# Patient Record
Sex: MALE | URBAN - NONMETROPOLITAN AREA
[De-identification: names, ages, dates, MRNs, and addresses within clinical notes are randomized per-mention and may not be internally consistent; named-entity substitution may affect disease eponyms.]

---

## 2024-03-28 ENCOUNTER — LAB (OUTPATIENT)
Dept: URBAN - NONMETROPOLITAN AREA CLINIC 2 | Facility: CLINIC | Age: 19
End: 2024-03-28

## 2024-03-28 ENCOUNTER — OV NP (OUTPATIENT)
Dept: URBAN - NONMETROPOLITAN AREA CLINIC 2 | Facility: CLINIC | Age: 19
End: 2024-03-28
Payer: COMMERCIAL

## 2024-03-28 VITALS
BODY MASS INDEX: 27.09 KG/M2 | HEART RATE: 69 BPM | HEIGHT: 72 IN | WEIGHT: 200 LBS | DIASTOLIC BLOOD PRESSURE: 81 MMHG | SYSTOLIC BLOOD PRESSURE: 151 MMHG | TEMPERATURE: 98 F

## 2024-03-28 DIAGNOSIS — B27.90 INFECTIOUS MONONUCLEOSIS WITHOUT COMPLICATION, INFECTIOUS MONONUCLEOSIS DUE TO UNSPECIFIED ORGANISM: ICD-10-CM

## 2024-03-28 DIAGNOSIS — R74.8 ELEVATED LIVER ENZYMES: ICD-10-CM

## 2024-03-28 DIAGNOSIS — Z83.79 FAMILY HISTORY OF LIVER DISEASE: ICD-10-CM

## 2024-03-28 PROBLEM — 266902008: Status: ACTIVE | Noted: 2024-03-28

## 2024-03-28 PROBLEM — 271558008: Status: ACTIVE | Noted: 2024-03-28

## 2024-03-28 PROBLEM — 707724006: Status: ACTIVE | Noted: 2024-03-28

## 2024-03-28 PROCEDURE — 99244 OFF/OP CNSLTJ NEW/EST MOD 40: CPT | Performed by: NURSE PRACTITIONER

## 2024-03-28 NOTE — HPI-TODAY'S VISIT:
3/28/2024 Keenan is a 18-year-old male referred to me by Dr. Archie Kelley for consultation of elevated liver enzymes.  A copy this note be sent to the referring physician. He contracted mono in December 2023 a week before Adrianna.  He had fever chills and jaundice.  His symptoms lasted for approximately 3 weeks, and then abated.  His liver enzymes unfortunately have not decreased significantly.  He has had contrasted imaging with hepatosplenomegaly.  He does take NSAIDs, his last dose was about 3 weeks ago.  He does drink typically 3 days a week usually 6-8 beers, usually [place] light.  He has a family history of liver disease in his paternal grandmother who contracted hepatitis C after a blood transfusion and is status post liver transplant x 2.  He has no other congenital or familial liver diseases that he is aware of.  Today he agrees to pursue labs, we will also check ultrasound Doppler.  Consider liver biopsy if his liver enzymes increased.  MB

## 2024-03-30 LAB
A/G RATIO: 1.6
ALBUMIN: 4.8
ALKALINE PHOSPHATASE: 98
ALT (SGPT): 80
ANION GAP: 14
AST (SGOT): 55
BILIRUBIN TOTAL: 1.1
BLOOD UREA NITROGEN: 20
BUN / CREAT RATIO: 24
CALCIUM: 10.5
CERULOPLASMIN: 32
CHLORIDE: 105
CO2: 25
CREATININE, SERUM: 0.83
EGFR (CKD-EPI): >60
GLUCOSE: 89
IRON SATURATION: 29
IRON: 113
Lab: 0.1
Lab: 0.2
POTASSIUM: 4.1
PROTEIN TOTAL: 7.8
SEDIMENTATION RATE, ERYTHROCYTE: 11
SODIUM: 140
TOTAL IRON BINDING CAPACITY: 393
UNSATURATED IRON BINDING CAPACITY: 280
